# Patient Record
Sex: FEMALE | Race: WHITE | NOT HISPANIC OR LATINO | Employment: FULL TIME | URBAN - METROPOLITAN AREA
[De-identification: names, ages, dates, MRNs, and addresses within clinical notes are randomized per-mention and may not be internally consistent; named-entity substitution may affect disease eponyms.]

---

## 2023-12-01 ENCOUNTER — APPOINTMENT (OUTPATIENT)
Dept: RADIOLOGY | Facility: IMAGING CENTER | Age: 53
End: 2023-12-01
Attending: PHYSICIAN ASSISTANT
Payer: COMMERCIAL

## 2023-12-01 ENCOUNTER — OFFICE VISIT (OUTPATIENT)
Dept: URGENT CARE | Facility: CLINIC | Age: 53
End: 2023-12-01
Payer: COMMERCIAL

## 2023-12-01 VITALS
RESPIRATION RATE: 14 BRPM | BODY MASS INDEX: 22.2 KG/M2 | TEMPERATURE: 96.9 F | HEART RATE: 80 BPM | OXYGEN SATURATION: 97 % | WEIGHT: 130 LBS | HEIGHT: 64 IN | SYSTOLIC BLOOD PRESSURE: 104 MMHG | DIASTOLIC BLOOD PRESSURE: 64 MMHG

## 2023-12-01 DIAGNOSIS — M25.531 RIGHT WRIST PAIN: ICD-10-CM

## 2023-12-01 DIAGNOSIS — M25.521 RIGHT ELBOW PAIN: ICD-10-CM

## 2023-12-01 PROCEDURE — 73080 X-RAY EXAM OF ELBOW: CPT | Mod: TC,RT | Performed by: RADIOLOGY

## 2023-12-01 PROCEDURE — 3074F SYST BP LT 130 MM HG: CPT | Performed by: PHYSICIAN ASSISTANT

## 2023-12-01 PROCEDURE — 99203 OFFICE O/P NEW LOW 30 MIN: CPT | Performed by: PHYSICIAN ASSISTANT

## 2023-12-01 PROCEDURE — 3078F DIAST BP <80 MM HG: CPT | Performed by: PHYSICIAN ASSISTANT

## 2023-12-01 PROCEDURE — 73110 X-RAY EXAM OF WRIST: CPT | Mod: TC,RT | Performed by: RADIOLOGY

## 2023-12-01 NOTE — PROGRESS NOTES
"Subjective:   Maritza Johnston is a 53 y.o. female who presents for Elbow Injury (Pt states took a fall this morning, slipped and landed on R side, R elbow, numbing, tingling )     This a very pleasant 53-year-old female who presents with chief complaint of right elbow and wrist pain.  She slipped and fell on some ice this morning, struck right elbow, sent here from [facility] for imaging.  She reports significant pain at the site of the elbow with decreased range of motion.  She reports localized swelling.  She does report some numbness and tingling to the hand.  She denies shoulder pain.  She does note some pain at the wrist..    Medications:  This patient does not have an active medication from one of the medication groupers.    Allergies:             Patient has no known allergies.    Surgical History:       No past surgical history on file.    Past Social Hx:  Maritza Johnston       Past Family Hx:   Maritza Johnston family history is not on file.       Problem list, medications, and allergies reviewed by myself today in Epic.     Objective:     /64 (BP Location: Left arm, Patient Position: Sitting, BP Cuff Size: Adult)   Pulse 80   Temp 36.1 °C (96.9 °F) (Temporal)   Resp 14   Ht 1.626 m (5' 4\")   Wt 59 kg (130 lb)   SpO2 97%   BMI 22.31 kg/m²     Physical Exam  Musculoskeletal:      Comments: Elbow/derm:   Without erythema, edema, or ecchymosis. Without discrete increase fluid to the olecranon  bursa. Without tenderness over the medial or lateral epicondyle, olecranon.  There is focal tenderness over the radial head with tenderness and pain, decreased range of motion with supination and pronation.  Decreased flexion. Without tenderness to the shoulder.  Mild tenderness at the distal radius with active range of motion with flexion extension at the wrist.  Full  strength.Neurovascular- intact distally. Good cap refill- less than 3 secs           RADIOLOGY RESULTS   DX-ELBOW-COMPLETE 3+ " RIGHT    Result Date: 12/1/2023 12/1/2023 11:09 AM HISTORY/REASON FOR EXAM:  Pain/Deformity Following Trauma; slip and fall, radial head pain TECHNIQUE/EXAM DESCRIPTION AND NUMBER OF VIEWS:  3 views of the RIGHT elbow. COMPARISON: None FINDINGS: There is no evidence of joint effusion. There is no evidence of displaced fracture or dislocation. There is no focal soft tissue swelling.     Normal elbow series.    DX-WRIST-COMPLETE 3+ RIGHT    Result Date: 12/1/2023 12/1/2023 11:09 AM HISTORY/REASON FOR EXAM:  Pain/Deformity Following Trauma; scaphoid/distal radius TECHNIQUE/EXAM DESCRIPTION AND NUMBER OF VIEWS: 4 of the right wrist COMPARISON: None. FINDINGS: There is normal bony mineralization.  There is no evidence of fracture, dislocation, or osseous lesion.  There is no evidence of soft tissue injury.     1.  No radiographic evidence of acute injury.           Assessment/Plan:     Diagnosis and Associated Orders:     1. Right elbow pain  - DX-ELBOW-COMPLETE 3+ RIGHT  - Referral to Orthopedics    2. Right wrist pain  - DX-WRIST-COMPLETE 3+ RIGHT  - Referral to Orthopedics        Comments/MDM:  No radiographic evidence of acute injury.  I do have suspicion about potential radial head involvement given degree of pain tenderness and decreased range of motion.  I am placing her in a sling.  Recommend ice elevation NSAIDs/Tylenol as needed for pain.  Recommend follow-up with orthopedics for reevaluation and possible repeat imaging in 7 to 10 days time if symptoms do not vivian.  Return precautions discussed at length.  I personally reviewed prior external notes and test results pertinent to today's visit. Supportive care, natural history, differential diagnoses, and indications for immediate follow-up discussed. Return to clinic or go to ED if symptoms worsen or persist.  Red flag symptoms discussed.  Patient/Parent/Guardian voices understanding. Follow-up with your primary care provider in 3-5 days.  All side effects of  medication discussed including allergic response, GI upset, tendon injury, rash, sedation etc    Please note that this dictation was created using voice recognition software. I have made a reasonable attempt to correct obvious errors, but I expect that there are errors of grammar and possibly content that I did not discover before finalizing the note.    This note was electronically signed by Elizabeth Franco PA-C